# Patient Record
Sex: FEMALE | Race: WHITE | NOT HISPANIC OR LATINO | Employment: OTHER | URBAN - METROPOLITAN AREA
[De-identification: names, ages, dates, MRNs, and addresses within clinical notes are randomized per-mention and may not be internally consistent; named-entity substitution may affect disease eponyms.]

---

## 2018-07-05 ENCOUNTER — OFFICE VISIT CONVERTED (OUTPATIENT)
Dept: OTHER | Facility: HOSPITAL | Age: 53
End: 2018-07-05
Attending: NURSE PRACTITIONER

## 2018-07-05 ENCOUNTER — CONVERSION ENCOUNTER (OUTPATIENT)
Dept: OTHER | Facility: HOSPITAL | Age: 53
End: 2018-07-05

## 2018-08-16 ENCOUNTER — OFFICE VISIT CONVERTED (OUTPATIENT)
Dept: OTHER | Facility: HOSPITAL | Age: 53
End: 2018-08-16
Attending: NURSE PRACTITIONER

## 2018-08-23 ENCOUNTER — OFFICE VISIT CONVERTED (OUTPATIENT)
Dept: OTHER | Facility: HOSPITAL | Age: 53
End: 2018-08-23
Attending: NURSE PRACTITIONER

## 2018-11-21 ENCOUNTER — OFFICE VISIT CONVERTED (OUTPATIENT)
Dept: OTHER | Facility: HOSPITAL | Age: 53
End: 2018-11-21
Attending: NURSE PRACTITIONER

## 2018-11-21 ENCOUNTER — CONVERSION ENCOUNTER (OUTPATIENT)
Dept: OTHER | Facility: HOSPITAL | Age: 53
End: 2018-11-21

## 2019-02-20 ENCOUNTER — HOSPITAL ENCOUNTER (OUTPATIENT)
Dept: OTHER | Facility: HOSPITAL | Age: 54
Discharge: HOME | End: 2019-02-20

## 2019-02-20 ENCOUNTER — OFFICE VISIT CONVERTED (OUTPATIENT)
Dept: OTHER | Facility: HOSPITAL | Age: 54
End: 2019-02-20
Attending: NURSE PRACTITIONER

## 2019-02-20 ENCOUNTER — CONVERSION ENCOUNTER (OUTPATIENT)
Dept: OTHER | Facility: HOSPITAL | Age: 54
End: 2019-02-20

## 2019-02-20 LAB
ALBUMIN SERPL-MCNC: 4 G/DL (ref 3.5–5)
ALBUMIN/GLOB SERPL: 1.3 {RATIO} (ref 1.4–2.6)
ALP SERPL-CCNC: 98 U/L (ref 53–141)
ALT SERPL-CCNC: 11 U/L (ref 10–40)
ANION GAP SERPL CALC-SCNC: 17 MMOL/L (ref 8–19)
AST SERPL-CCNC: 11 U/L (ref 15–50)
BASOPHILS # BLD AUTO: 0.06 10*3/UL (ref 0–0.2)
BASOPHILS NFR BLD AUTO: 0.7 % (ref 0–3)
BILIRUB SERPL-MCNC: 0.28 MG/DL (ref 0.2–1.3)
BUN SERPL-MCNC: 12 MG/DL (ref 5–25)
BUN/CREAT SERPL: 16 {RATIO} (ref 6–20)
CALCIUM SERPL-MCNC: 9.2 MG/DL (ref 8.7–10.4)
CARBAMAZEPINE SERPL-MCNC: 8.1 UG/ML (ref 4–12)
CHLORIDE SERPL-SCNC: 99 MMOL/L (ref 99–111)
CHOLEST SERPL-MCNC: 171 MG/DL (ref 107–200)
CHOLEST/HDLC SERPL: 3.4 {RATIO} (ref 3–6)
CONV ABS IMM GRAN: 0.06 10*3/UL (ref 0–0.2)
CONV CO2: 27 MMOL/L (ref 22–32)
CONV CREATININE URINE, RANDOM: 149.9 MG/DL (ref 10–300)
CONV IMMATURE GRAN: 0.7 % (ref 0–1.8)
CONV MICROALBUM.,U,RANDOM: <12 MG/L (ref 0–20)
CONV TOTAL PROTEIN: 7.2 G/DL (ref 6.3–8.2)
CREAT UR-MCNC: 0.75 MG/DL (ref 0.5–0.9)
DEPRECATED RDW RBC AUTO: 48.7 FL (ref 36.4–46.3)
EOSINOPHIL # BLD AUTO: 0.18 10*3/UL (ref 0–0.7)
EOSINOPHIL # BLD AUTO: 2.1 % (ref 0–7)
ERYTHROCYTE [DISTWIDTH] IN BLOOD BY AUTOMATED COUNT: 14.6 % (ref 11.7–14.4)
EST. AVERAGE GLUCOSE BLD GHB EST-MCNC: 148 MG/DL
GFR SERPLBLD BASED ON 1.73 SQ M-ARVRAT: >60 ML/MIN/{1.73_M2}
GLOBULIN UR ELPH-MCNC: 3.2 G/DL (ref 2–3.5)
GLUCOSE SERPL-MCNC: 177 MG/DL (ref 65–99)
HBA1C MFR BLD: 11.7 G/DL (ref 12–16)
HBA1C MFR BLD: 6.8 % (ref 3.5–5.7)
HCT VFR BLD AUTO: 37.1 % (ref 37–47)
HDLC SERPL-MCNC: 51 MG/DL (ref 40–60)
LDLC SERPL CALC-MCNC: 84 MG/DL (ref 70–100)
LYMPHOCYTES # BLD AUTO: 1.6 10*3/UL (ref 1–5)
MCH RBC QN AUTO: 28.9 PG (ref 27–31)
MCHC RBC AUTO-ENTMCNC: 31.5 G/DL (ref 33–37)
MCV RBC AUTO: 91.6 FL (ref 81–99)
MICROALBUMIN/CREAT UR: 8 MG/G{CRE} (ref 0–35)
MONOCYTES # BLD AUTO: 0.43 10*3/UL (ref 0.2–1.2)
MONOCYTES NFR BLD AUTO: 5.1 % (ref 3–10)
NEUTROPHILS # BLD AUTO: 6.09 10*3/UL (ref 2–8)
NEUTROPHILS NFR BLD AUTO: 72.4 % (ref 30–85)
NRBC CBCN: 0 % (ref 0–0.7)
OSMOLALITY SERPL CALC.SUM OF ELEC: 292 MOSM/KG (ref 273–304)
PLATELET # BLD AUTO: 194 10*3/UL (ref 130–400)
PMV BLD AUTO: 9.3 FL (ref 9.4–12.3)
POTASSIUM SERPL-SCNC: 3.7 MMOL/L (ref 3.5–5.3)
RBC # BLD AUTO: 4.05 10*6/UL (ref 4.2–5.4)
SODIUM SERPL-SCNC: 139 MMOL/L (ref 135–147)
T4 FREE SERPL-MCNC: 1 NG/DL (ref 0.9–1.8)
TRIGL SERPL-MCNC: 178 MG/DL (ref 40–150)
TSH SERPL-ACNC: 1.83 M[IU]/L (ref 0.27–4.2)
VARIANT LYMPHS NFR BLD MANUAL: 19 % (ref 20–45)
VLDLC SERPL-MCNC: 36 MG/DL (ref 5–37)
WBC # BLD AUTO: 8.42 10*3/UL (ref 4.8–10.8)

## 2019-03-18 ENCOUNTER — OFFICE VISIT CONVERTED (OUTPATIENT)
Dept: OTHER | Facility: HOSPITAL | Age: 54
End: 2019-03-18
Attending: NURSE PRACTITIONER

## 2019-03-18 ENCOUNTER — CONVERSION ENCOUNTER (OUTPATIENT)
Dept: OTHER | Facility: HOSPITAL | Age: 54
End: 2019-03-18

## 2019-08-06 ENCOUNTER — OFFICE VISIT CONVERTED (OUTPATIENT)
Dept: OTHER | Facility: HOSPITAL | Age: 54
End: 2019-08-06
Attending: NURSE PRACTITIONER

## 2019-08-06 ENCOUNTER — CONVERSION ENCOUNTER (OUTPATIENT)
Dept: OTHER | Facility: HOSPITAL | Age: 54
End: 2019-08-06

## 2019-08-21 ENCOUNTER — OFFICE VISIT CONVERTED (OUTPATIENT)
Dept: OTHER | Facility: HOSPITAL | Age: 54
End: 2019-08-21
Attending: NURSE PRACTITIONER

## 2019-08-21 ENCOUNTER — CONVERSION ENCOUNTER (OUTPATIENT)
Dept: OTHER | Facility: HOSPITAL | Age: 54
End: 2019-08-21

## 2019-10-31 ENCOUNTER — OFFICE VISIT CONVERTED (OUTPATIENT)
Dept: OTHER | Facility: HOSPITAL | Age: 54
End: 2019-10-31
Attending: NURSE PRACTITIONER

## 2019-10-31 ENCOUNTER — HOSPITAL ENCOUNTER (OUTPATIENT)
Dept: OTHER | Facility: HOSPITAL | Age: 54
Discharge: HOME | End: 2019-10-31

## 2019-10-31 ENCOUNTER — CONVERSION ENCOUNTER (OUTPATIENT)
Dept: OTHER | Facility: HOSPITAL | Age: 54
End: 2019-10-31

## 2019-10-31 LAB
ALBUMIN SERPL-MCNC: 4.1 G/DL (ref 3.5–5)
ALBUMIN/GLOB SERPL: 1.2 {RATIO} (ref 1.4–2.6)
ALP SERPL-CCNC: 96 U/L (ref 53–141)
ALT SERPL-CCNC: 8 U/L (ref 10–40)
ANION GAP SERPL CALC-SCNC: 20 MMOL/L (ref 8–19)
AST SERPL-CCNC: 11 U/L (ref 15–50)
BASOPHILS # BLD AUTO: 0.04 10*3/UL (ref 0–0.2)
BASOPHILS NFR BLD AUTO: 0.5 % (ref 0–3)
BILIRUB SERPL-MCNC: 0.32 MG/DL (ref 0.2–1.3)
BUN SERPL-MCNC: 7 MG/DL (ref 5–25)
BUN/CREAT SERPL: 9 {RATIO} (ref 6–20)
CALCIUM SERPL-MCNC: 9.4 MG/DL (ref 8.7–10.4)
CHLORIDE SERPL-SCNC: 98 MMOL/L (ref 99–111)
CHOLEST SERPL-MCNC: 167 MG/DL (ref 107–200)
CHOLEST/HDLC SERPL: 4.3 {RATIO} (ref 3–6)
CONV ABS IMM GRAN: 0.09 10*3/UL (ref 0–0.2)
CONV CO2: 27 MMOL/L (ref 22–32)
CONV CREATININE URINE, RANDOM: 130 MG/DL (ref 10–300)
CONV IMMATURE GRAN: 1.1 % (ref 0–1.8)
CONV MICROALBUM.,U,RANDOM: <12 MG/L (ref 0–20)
CONV TOTAL PROTEIN: 7.4 G/DL (ref 6.3–8.2)
CREAT UR-MCNC: 0.74 MG/DL (ref 0.5–0.9)
DEPRECATED RDW RBC AUTO: 47.8 FL (ref 36.4–46.3)
EOSINOPHIL # BLD AUTO: 0.12 10*3/UL (ref 0–0.7)
EOSINOPHIL # BLD AUTO: 1.4 % (ref 0–7)
ERYTHROCYTE [DISTWIDTH] IN BLOOD BY AUTOMATED COUNT: 14.6 % (ref 11.7–14.4)
EST. AVERAGE GLUCOSE BLD GHB EST-MCNC: 189 MG/DL
GFR SERPLBLD BASED ON 1.73 SQ M-ARVRAT: >60 ML/MIN/{1.73_M2}
GLOBULIN UR ELPH-MCNC: 3.3 G/DL (ref 2–3.5)
GLUCOSE SERPL-MCNC: 197 MG/DL (ref 65–99)
HBA1C MFR BLD: 8.2 % (ref 3.5–5.7)
HCT VFR BLD AUTO: 36.1 % (ref 37–47)
HDLC SERPL-MCNC: 39 MG/DL (ref 40–60)
HGB BLD-MCNC: 11.7 G/DL (ref 12–16)
LDLC SERPL CALC-MCNC: 59 MG/DL (ref 70–100)
LYMPHOCYTES # BLD AUTO: 1.31 10*3/UL (ref 1–5)
LYMPHOCYTES NFR BLD AUTO: 15.5 % (ref 20–45)
MCH RBC QN AUTO: 29.2 PG (ref 27–31)
MCHC RBC AUTO-ENTMCNC: 32.4 G/DL (ref 33–37)
MCV RBC AUTO: 90 FL (ref 81–99)
MICROALBUMIN/CREAT UR: 9.2 MG/G{CRE} (ref 0–35)
MONOCYTES # BLD AUTO: 0.31 10*3/UL (ref 0.2–1.2)
MONOCYTES NFR BLD AUTO: 3.7 % (ref 3–10)
NEUTROPHILS # BLD AUTO: 6.57 10*3/UL (ref 2–8)
NEUTROPHILS NFR BLD AUTO: 77.8 % (ref 30–85)
NRBC CBCN: 0 % (ref 0–0.7)
OSMOLALITY SERPL CALC.SUM OF ELEC: 295 MOSM/KG (ref 273–304)
PLATELET # BLD AUTO: 158 10*3/UL (ref 130–400)
PMV BLD AUTO: 8.6 FL (ref 9.4–12.3)
POTASSIUM SERPL-SCNC: 3.5 MMOL/L (ref 3.5–5.3)
RBC # BLD AUTO: 4.01 10*6/UL (ref 4.2–5.4)
SODIUM SERPL-SCNC: 141 MMOL/L (ref 135–147)
T4 FREE SERPL-MCNC: 1 NG/DL (ref 0.9–1.8)
TRIGL SERPL-MCNC: 344 MG/DL (ref 40–150)
TSH SERPL-ACNC: 1.43 M[IU]/L (ref 0.27–4.2)
VLDLC SERPL-MCNC: 69 MG/DL (ref 5–37)
WBC # BLD AUTO: 8.44 10*3/UL (ref 4.8–10.8)

## 2019-11-27 ENCOUNTER — OFFICE VISIT CONVERTED (OUTPATIENT)
Dept: OTHER | Facility: HOSPITAL | Age: 54
End: 2019-11-27
Attending: NURSE PRACTITIONER

## 2019-11-27 ENCOUNTER — CONVERSION ENCOUNTER (OUTPATIENT)
Dept: OTHER | Facility: HOSPITAL | Age: 54
End: 2019-11-27

## 2019-12-18 ENCOUNTER — OFFICE VISIT CONVERTED (OUTPATIENT)
Dept: OTHER | Facility: HOSPITAL | Age: 54
End: 2019-12-18
Attending: NURSE PRACTITIONER

## 2019-12-18 ENCOUNTER — CONVERSION ENCOUNTER (OUTPATIENT)
Dept: OTHER | Facility: HOSPITAL | Age: 54
End: 2019-12-18

## 2021-05-15 VITALS
DIASTOLIC BLOOD PRESSURE: 84 MMHG | BODY MASS INDEX: 47.66 KG/M2 | WEIGHT: 259 LBS | HEART RATE: 78 BPM | TEMPERATURE: 98 F | SYSTOLIC BLOOD PRESSURE: 142 MMHG | HEIGHT: 62 IN | OXYGEN SATURATION: 91 % | RESPIRATION RATE: 18 BRPM

## 2021-05-15 VITALS
BODY MASS INDEX: 51.71 KG/M2 | SYSTOLIC BLOOD PRESSURE: 138 MMHG | HEART RATE: 94 BPM | WEIGHT: 281 LBS | TEMPERATURE: 97.8 F | RESPIRATION RATE: 18 BRPM | HEIGHT: 62 IN | DIASTOLIC BLOOD PRESSURE: 80 MMHG | OXYGEN SATURATION: 94 %

## 2021-05-15 VITALS
WEIGHT: 254 LBS | HEART RATE: 85 BPM | OXYGEN SATURATION: 95 % | RESPIRATION RATE: 18 BRPM | TEMPERATURE: 97.6 F | SYSTOLIC BLOOD PRESSURE: 128 MMHG | BODY MASS INDEX: 46.74 KG/M2 | DIASTOLIC BLOOD PRESSURE: 79 MMHG | HEIGHT: 62 IN

## 2021-05-15 VITALS
HEART RATE: 93 BPM | DIASTOLIC BLOOD PRESSURE: 60 MMHG | SYSTOLIC BLOOD PRESSURE: 138 MMHG | WEIGHT: 265 LBS | OXYGEN SATURATION: 93 % | HEIGHT: 62 IN | BODY MASS INDEX: 48.76 KG/M2 | TEMPERATURE: 97 F | RESPIRATION RATE: 18 BRPM

## 2021-05-15 VITALS
DIASTOLIC BLOOD PRESSURE: 82 MMHG | OXYGEN SATURATION: 93 % | BODY MASS INDEX: 49.13 KG/M2 | TEMPERATURE: 97.4 F | RESPIRATION RATE: 16 BRPM | HEIGHT: 62 IN | HEART RATE: 88 BPM | SYSTOLIC BLOOD PRESSURE: 118 MMHG | WEIGHT: 267 LBS

## 2021-05-15 VITALS
BODY MASS INDEX: 50.32 KG/M2 | WEIGHT: 284 LBS | TEMPERATURE: 97.4 F | HEIGHT: 63 IN | HEART RATE: 88 BPM | RESPIRATION RATE: 18 BRPM | DIASTOLIC BLOOD PRESSURE: 72 MMHG | SYSTOLIC BLOOD PRESSURE: 124 MMHG | OXYGEN SATURATION: 94 %

## 2021-05-15 VITALS
HEART RATE: 80 BPM | SYSTOLIC BLOOD PRESSURE: 134 MMHG | DIASTOLIC BLOOD PRESSURE: 75 MMHG | RESPIRATION RATE: 18 BRPM | WEIGHT: 253 LBS | BODY MASS INDEX: 46.56 KG/M2 | TEMPERATURE: 98 F | OXYGEN SATURATION: 92 % | HEIGHT: 62 IN

## 2021-05-16 VITALS
TEMPERATURE: 97.3 F | DIASTOLIC BLOOD PRESSURE: 80 MMHG | HEIGHT: 62 IN | HEART RATE: 88 BPM | BODY MASS INDEX: 53 KG/M2 | WEIGHT: 288 LBS | RESPIRATION RATE: 18 BRPM | SYSTOLIC BLOOD PRESSURE: 138 MMHG | OXYGEN SATURATION: 91 %

## 2021-05-16 VITALS
WEIGHT: 293 LBS | SYSTOLIC BLOOD PRESSURE: 124 MMHG | BODY MASS INDEX: 53.92 KG/M2 | TEMPERATURE: 97.2 F | HEIGHT: 62 IN | OXYGEN SATURATION: 93 % | DIASTOLIC BLOOD PRESSURE: 60 MMHG | HEART RATE: 72 BPM | RESPIRATION RATE: 18 BRPM

## 2021-05-16 VITALS
WEIGHT: 288 LBS | BODY MASS INDEX: 53 KG/M2 | TEMPERATURE: 97.5 F | HEIGHT: 62 IN | DIASTOLIC BLOOD PRESSURE: 62 MMHG | HEART RATE: 95 BPM | RESPIRATION RATE: 16 BRPM | SYSTOLIC BLOOD PRESSURE: 94 MMHG | OXYGEN SATURATION: 93 %

## 2021-05-16 VITALS
RESPIRATION RATE: 18 BRPM | DIASTOLIC BLOOD PRESSURE: 70 MMHG | BODY MASS INDEX: 53.92 KG/M2 | OXYGEN SATURATION: 92 % | HEIGHT: 62 IN | HEART RATE: 83 BPM | TEMPERATURE: 97.8 F | SYSTOLIC BLOOD PRESSURE: 118 MMHG | WEIGHT: 293 LBS

## 2023-05-23 ENCOUNTER — OFFICE VISIT (OUTPATIENT)
Dept: NEUROLOGY | Facility: CLINIC | Age: 58
End: 2023-05-23
Payer: COMMERCIAL

## 2023-05-23 VITALS
DIASTOLIC BLOOD PRESSURE: 82 MMHG | SYSTOLIC BLOOD PRESSURE: 138 MMHG | BODY MASS INDEX: 40.57 KG/M2 | HEART RATE: 83 BPM | WEIGHT: 229 LBS | HEIGHT: 63 IN

## 2023-05-23 DIAGNOSIS — Z91.81 AT RISK FOR FALLS: ICD-10-CM

## 2023-05-23 DIAGNOSIS — R26.81 UNSTEADINESS: ICD-10-CM

## 2023-05-23 DIAGNOSIS — E11.42 DIABETIC PERIPHERAL NEUROPATHY: Primary | ICD-10-CM

## 2023-05-23 PROCEDURE — 99204 OFFICE O/P NEW MOD 45 MIN: CPT | Performed by: PSYCHIATRY & NEUROLOGY

## 2023-05-23 RX ORDER — BUSPIRONE HYDROCHLORIDE 5 MG/1
5 TABLET ORAL 3 TIMES DAILY
COMMUNITY

## 2023-05-23 RX ORDER — LISINOPRIL 5 MG/1
5 TABLET ORAL DAILY
COMMUNITY

## 2023-05-23 RX ORDER — CARBAMAZEPINE 200 MG/1
200 TABLET, EXTENDED RELEASE ORAL 4 TIMES DAILY
COMMUNITY

## 2023-05-23 RX ORDER — ESCITALOPRAM OXALATE 10 MG/1
10 TABLET ORAL DAILY
COMMUNITY

## 2023-05-23 RX ORDER — ORAL SEMAGLUTIDE 3 MG/1
3 TABLET ORAL DAILY
COMMUNITY

## 2023-05-23 RX ORDER — MELATONIN
1000 DAILY
COMMUNITY

## 2023-05-23 RX ORDER — GABAPENTIN 300 MG/1
300 CAPSULE ORAL 3 TIMES DAILY
COMMUNITY

## 2023-05-23 RX ORDER — ROSUVASTATIN CALCIUM 20 MG/1
20 TABLET, COATED ORAL DAILY
COMMUNITY

## 2023-05-23 RX ORDER — LEVOTHYROXINE SODIUM 0.07 MG/1
75 TABLET ORAL DAILY
COMMUNITY

## 2023-05-23 RX ORDER — SUMATRIPTAN 50 MG/1
50 TABLET, FILM COATED ORAL ONCE AS NEEDED
COMMUNITY

## 2023-05-23 RX ORDER — OMEPRAZOLE 20 MG/1
20 CAPSULE, DELAYED RELEASE ORAL DAILY
COMMUNITY

## 2023-05-23 RX ORDER — FUROSEMIDE 20 MG/1
20 TABLET ORAL 2 TIMES DAILY
COMMUNITY

## 2023-05-23 RX ORDER — DULOXETIN HYDROCHLORIDE 30 MG/1
30 CAPSULE, DELAYED RELEASE ORAL 3 TIMES DAILY
COMMUNITY

## 2023-05-23 RX ORDER — MONTELUKAST SODIUM 10 MG/1
10 TABLET ORAL NIGHTLY
COMMUNITY

## 2023-05-23 NOTE — PROGRESS NOTES
"Chief Complaint  Tremors and Gait Problem    Subjective          Theresa Weaver is a 57 y.o. female who presents to [facility]  History of Present Illness  57-year-old woman evaluated for gait abnormalities since she fell in December 2021.  She states that she fell 2 and half years ago and she has been unsteady since that time.  She has problems getting up and down the steps without help.  She cannot tell her legs to  the right leg.  It feels disconnected.  She is unsteady when she is walking on grass.  She also hold onto furniture's at time as well as the walls.  She states that there is numbness up to her mid leg.  She has a history of diabetes for the last 4 to 5 years.    She has had problems before getting lost in her own bedroom states she is unable to tell which part of the bedroom she is seeing if it is dark.  Had problems walking in the grass as well as uneven surfaces and inclines.    She states that she gets a tremor once in a while when she gets out of the shower but is not constant and it does not affect her activities of daily living.    She has history of migraine headaches.  She has seen other neurologist in the past for that.    Objective   Vital Signs:   /82   Pulse 83   Ht 160 cm (63\")   Wt 104 kg (229 lb)   BMI 40.57 kg/m²     Physical Exam   She is alert, fluent, phasic, follows commands well.  Optic disc are normal bilaterally visual fields are full, EOMs full all directions gaze, facial strength is full, soft palate elevation and tongue are normal.  There is no weakness of the upper extremities on individual muscle testing.  There is no tremor noted on finger-to-nose testing although she is slow.  There is no resting tremor.  There is no weakness of the lower extremity on individual muscle testing except for distally where there is 4/5 strength on dorsiflexion, eversion and inversion.  She is able to get up with arms crossed sitting " 3 times with slight difficulty.  Reflexes are absent in the biceps, triceps, patellar's and ankles.  Sensation is decreased to pinprick in a stocking distribution all the way to the knees.  Cold sensation is abnormal to the knees.  Vibration is absent in the right toe and ankle and decrease in the left toe and ankle.  Proprioception is impaired in the toes and Romberg is positive.  She is unable to tiptoe, heel walk.  She has no power to tiptoe or heel walk.  She is unable to tandem without losing her balance.  She has a wide-based gait.  She has does not  her feet and keeps it close to the ground.  She does not have parkinsonian gait and armswing is normal.        Assessment and Plan  Diagnoses and all orders for this visit:    1. Diabetic peripheral neuropathy (Primary)  Assessment & Plan:  I discussed with her that her and her  that her gait abnormalities is secondary to diabetic polyneuropathy.  I discussed with her that she has significant proprioceptive defects.  I will order other laboratory work-up to exclude other etiologies including small vessel disease, normal pressure hydrocephalus, spinal stenosis that can cause her gait abnormalities.  I will also order carbamazepine level since she is taking a high dose of carbamazepine and I would recommend for her neurologist or her prescribing physician to lower the dose of carbamazepine which can cause worsening ataxia and an unsteady person.  I would also order vitamin B12 level and immunofixation electrophoresis, CPK.  They are to call me to find out the results of the testing and MRI of the brain.    I discussed with her and her  the treatment is to use a cane or a walker to use her hands for her proprioceptive deficits.  This will decrease her risk for falling down.  Thank you for letting me participate in her care.    Orders:  -     MRI Cervical Spine Without Contrast; Future  -     MRI Brain Without Contrast; Future  -     Carbamazepine  Level, Total; Future  -     Vitamin B12; Future  -     Folate; Future  -     Immunofixation electrophoresis; Future  -     CK; Future    2. Unsteadiness  -     MRI Cervical Spine Without Contrast; Future  -     MRI Brain Without Contrast; Future  -     Carbamazepine Level, Total; Future  -     Vitamin B12; Future  -     Folate; Future  -     Immunofixation electrophoresis; Future  -     CK; Future    3. At risk for falls  -     MRI Cervical Spine Without Contrast; Future  -     MRI Brain Without Contrast; Future  -     Carbamazepine Level, Total; Future  -     Vitamin B12; Future  -     Folate; Future  -     Immunofixation electrophoresis; Future  -     CK; Future       Total time spent with the patient and coordinating patient care was 50 minutes.    Follow Up  No follow-ups on file.  Patient was given instructions and counseling regarding her condition or for health maintenance advice. Please see specific information pulled into the AVS if appropriate.

## 2023-05-23 NOTE — ASSESSMENT & PLAN NOTE
I discussed with her that her and her  that her gait abnormalities is secondary to diabetic polyneuropathy.  I discussed with her that she has significant proprioceptive defects.  I will order other laboratory work-up to exclude other etiologies including small vessel disease, normal pressure hydrocephalus, spinal stenosis that can cause her gait abnormalities.  I will also order carbamazepine level since she is taking a high dose of carbamazepine and I would recommend for her neurologist or her prescribing physician to lower the dose of carbamazepine which can cause worsening ataxia and an unsteady person.  I would also order vitamin B12 level and immunofixation electrophoresis, CPK.  They are to call me to find out the results of the testing and MRI of the brain.    I discussed with her and her  the treatment is to use a cane or a walker to use her hands for her proprioceptive deficits.  This will decrease her risk for falling down.  Thank you for letting me participate in her care.